# Patient Record
Sex: FEMALE | Race: ASIAN | Employment: OTHER | URBAN - METROPOLITAN AREA
[De-identification: names, ages, dates, MRNs, and addresses within clinical notes are randomized per-mention and may not be internally consistent; named-entity substitution may affect disease eponyms.]

---

## 2022-06-01 NOTE — PROGRESS NOTES
Juan Antonio Lizama   68 y.o. female   1945    This is patient's first visit with me. Juan Antonio Lizama is here to establish care as their new PCP. Juan Antonio Lizama has a PMH significant for:    Patient Active Problem List   Diagnosis    Non-English speaking patient - speaks Mandarin    Essential hypertension       Reason(s) for visit:   Chief Complaint   Patient presents with   Rut Eng Established New Doctor    Rash     Started on body but has spread to face. Itching. All over. Worsening. Pt moved here 6mo ago from [place].  Cough     Only through the night. On abx. HPI:    Office visit encounter:    Patient came with her son-in-law. She moved from [place] 6 months ago. Rash:  -Onset: 2-2.5 mo ago. -History: Patient stated that rash presented around her chest/body as and LLE. Rash spread to the right side of her face earlier this week. Patient saw urgent care last week and   -Triggers: worsened after taking a shower. -Meds tried:  gave her topical agents - mupirocin, erythromycin, and calamine; oral clindamdycin - prescribed by urgent care; she hasn't taken this yet    HTN:  -Meds tried: Valsartan  -No hx of TIA/CVA  -No issues of dyspnea, orthopnea, fatigue, malaise, etc.    Persistent dry cough:  -Patient has been taking Amoxicillin for \"several months\"?   Her  is a pharmacist in [place].    -Has coughing more at night  -No issues with nasal congestion, rhinorrhea, fever, sore throat, sinus pressure.  -Occurred around winter time 2022    PDMP monitoring:  -Revealed no controlled medications written of any kind.    -Last report:   Last PDMP Olga Vizcaino as Reviewed [facility]):  Review User Review Instant Review Result   [address],[place] [address], [address] 6/1/2022  2:57 PM Reviewed PDMP [1]       No Known Allergies    Current Outpatient Medications on File Prior to Visit   Medication Sig Dispense Refill    VALSARTAN PO Take by mouth      mupirocin (BACTROBAN) 2 % ointment Apply topically 3 times daily Apply topically 3 times daily.      erythromycin with ethanol (EMGEL) 2 % gel Apply topically daily Apply topically daily.  calamine lotion Apply topically as needed Apply topically as needed. No current facility-administered medications on file prior to visit. History reviewed. No pertinent family history. Social History     Tobacco Use    Smoking status: Never Smoker    Smokeless tobacco: Never Used   Substance Use Topics    Alcohol use: Never        No results found for: WBC, HGB, HCT, MCV, PLT      Chemistry    No results found for: NA, K, CL, CO2, BUN, CREATININE, GLU No results found for: CALCIUM, ALKPHOS, AST, ALT, BILITOT       No results found for: ALT, AST, GGT, ALKPHOS, BILITOT    Review of Systems   Constitutional: Negative for activity change, appetite change, fatigue, fever and unexpected weight change. HENT: Negative for congestion, rhinorrhea, sinus pressure and trouble swallowing. Respiratory: Negative for cough, chest tightness, shortness of breath and wheezing. Cardiovascular: Negative for chest pain, palpitations and leg swelling. Gastrointestinal: Negative for abdominal distention, abdominal pain, blood in stool, constipation, diarrhea, nausea and vomiting. Genitourinary: Negative for dysuria, frequency and hematuria. Musculoskeletal: Negative for arthralgias and back pain. Skin: Negative for rash. Neurological: Negative for dizziness, weakness, light-headedness, numbness and headaches. Wt Readings from Last 3 Encounters:   06/03/22 164 lb 9.6 oz (74.7 kg)       BP Readings from Last 3 Encounters:   06/03/22 118/62       Pulse Readings from Last 3 Encounters:   06/03/22 76       /62   Pulse 76   Temp 97.2 °F (36.2 °C)   Ht 5' 2\" (1.575 m)   Wt 164 lb 9.6 oz (74.7 kg)   SpO2 96%   BMI 30.11 kg/m²      Physical Exam  Vitals reviewed. Constitutional:       General: She is awake. She is not in acute distress. Appearance: She is obese.  She is not ill-appearing or diaphoretic. HENT:      Head: Normocephalic and atraumatic. No abrasion or masses. Hair is normal.      Right Ear: External ear normal.      Left Ear: External ear normal.      Nose: Nose normal.   Eyes:      General: Lids are normal. Gaze aligned appropriately. No scleral icterus. Right eye: No discharge. Left eye: No discharge. Extraocular Movements: Extraocular movements intact. Conjunctiva/sclera: Conjunctivae normal.   Neck:      Trachea: Phonation normal.   Cardiovascular:      Rate and Rhythm: Normal rate and regular rhythm. Pulmonary:      Effort: Pulmonary effort is normal. No respiratory distress. Breath sounds: No wheezing, rhonchi or rales. Abdominal:      General: Abdomen is flat. There is no distension. Palpations: Abdomen is soft. Musculoskeletal:         General: No deformity. Normal range of motion. Cervical back: Normal range of motion. No erythema. Right lower leg: No edema. Left lower leg: No edema. Skin:     Coloration: Skin is not cyanotic, jaundiced or pale. Findings: Rash (refer to images below) present. No abrasion, abscess, bruising, ecchymosis, erythema, signs of injury, laceration, lesion, petechiae or wound. Comments: LLE: impetigo type lesions with no active purulent discharge or bleeding; non-tender to palpation; fairly cool to touch; non-pitting edema. Multiple circumscribe pinpoint red lesions scattered throughout her abdomen and upper extremities with signs of excoriation. Neurological:      General: No focal deficit present. Mental Status: She is alert. Mental status is at baseline. GCS: GCS eye subscore is 4. GCS verbal subscore is 5. GCS motor subscore is 6. Cranial Nerves: No cranial nerve deficit, dysarthria or facial asymmetry. Motor: No weakness, tremor, atrophy or seizure activity. Coordination: Coordination normal.      Gait: Gait is intact.    Psychiatric: Attention and Perception: Attention and perception normal.         Mood and Affect: Mood and affect normal.         Speech: Speech normal.         Behavior: Behavior normal. Behavior is cooperative. Thought Content: Thought content normal.                         Imaging was obtained with patient's consent. Patient was informed that the pictures were taken with my own personal mobile phone and uploaded via mobile brendon (called [employer]iku) directly onto the patient's electronic health chart. Patient was also informed that any images obtained would not be saved on my mobile phone. The patient was shown the image(s) in their chart. Assessment/Plan:   Aaron Aguilar was seen today for established new doctor, rash and cough. Diagnoses and all orders for this visit:    Encounter to establish care with new doctor    Essential hypertension    Generalized rash  Comments:  Patient has impetigo type lesions and if I understood correctly has been prescribed topical abx by her  (who is a pharmacist in [place]). Will see how she responds to oral abx therapy along with topical and oral corticosteroid therapy. I also recommended that patient keep the same hygiene products and overall not try anything new at all (OTC supplements, food, drink, fragrances, etc.). I also advised patient to avoid going outdoors and drinking lots of water. Orders:  -     C-Reactive Protein; Future  -     doxycycline hyclate (VIBRA-TABS) 100 MG tablet; Take 1 tablet by mouth every 12 hours  -     cephALEXin (KEFLEX) 500 MG capsule; Take 1 capsule by mouth 3 times daily  -     methylPREDNISolone (MEDROL DOSEPACK) 4 MG tablet; Take by mouth as instructed  -     triamcinolone (KENALOG) 0.025 % ointment; Apply topically 2 times daily to neck, body, arms, and legs until resolution  -     fluocinolone 0.01 % cream; Apply twice daily to right side of face x 5-7 days until resolution  -     Hepatic Function Panel;  Future  -     Sedimentation Rate; Future    Generalized pruritus        Comments:        Cautioned patient that hydroxyzine can cause drowsiness. Orders:  -     hydrOXYzine HCl (ATARAX) 10 MG tablet; Take 1 tablet by mouth every 8 hours as needed for Itching  -     Hepatic Function Panel; Future  -     Renal Function Panel; Future  -     Sedimentation Rate; Future    Chronic cough        Comments: Will get CXR and evaluate for infection. Orders:  -     XR CHEST STANDARD (2 VW); Future  -     CBC with Auto Differential; Future  -     C-Reactive Protein; Future  -     Allergen, Respiratory, Region 5 Panel; Future  -     Procalcitonin; Future    Class 1 obesity due to excess calories with serious comorbidity and body mass index (BMI) of 30.0 to 30.9 in adult    Non-English speaking patient - speaks Mandarin  Comments:  Patient was offered an independent , but she declined that in favor of using her son-in-law. I reviewed the plan of care with the patient. Patient acknowledged understanding and agreed with plan of care overall. Medications Discontinued During This Encounter   Medication Reason    CLINDAMYCIN HCL PO Stop Taking at Discharge    AMOXICILLIN PO Stop Taking at Discharge        General information on medications:  -When it comes to medications, whether with starting or adding a new medication or increasing the dose of a current medication, the benefits and risks have to always be considered and weighed over, especially if one is taking other medications as well.    -There are no medications that have no side effects and that there is always a risk involved with taking a medication.    -If a side effect were to occur with starting a new medication or with increasing the dose of a current medication that either the medication can be totally discontinued altogether or simply decrease the dose of it and if this would be the case a follow-up appointment would be deemed necessary.    -The drug allergy list will then be updated with the corresponding side effect(s) if it's deemed to be a true 'drug allergy'. -The most common adverse effects of medication(s) were addressed at today's visit.    -Lastly, the coverage status of a medication may vary from insurance to insurance and the only way to verify if the medication is covered is to send an actual prescription in.    -The drug formulary of each insurance changes without any warning or notification to the healthcare provider let alone the pharmacy.  -The cost of medications vary from insurance to insurance and the cost is always subject to change just like the drug formulary. Follow-up: Return in about 27 days (around 6/30/2022) for IP - rash. .     Patient was informed that if his or her symptoms worsen to follow up with me sooner or go to the nearest ER if the symptoms are very significant and warrant higher level of care. Regarding my note:  -This note was composed (by me only and not with assistance via a scribe) to the best of my knowledge and recollection of the encounter with the patient using one of my own customized note templates utilizing a combination of typing and dictating with the [address] [place] [address] speech recognition software. As a result, the note may possibly contain various errors (e.g. spelling, grammar, and non-sensible words/phrases/statements) despite reviewing the note prior to signing it for completion. Time spent includes some or all of the following, both face-to-face time and non face-to-face time, but is not limited to:  [x] Preparing to see the patient by reviewing medical records available (notes, labs, imaging, etc.) prior to seeing the patient. [x] Obtaining and/or reviewing the history from the patient. [x] Performing a medically appropriate examination. [x] Ordering of relevant lab work, medications, referrals, or procedures.   [x] Discussing patient's medical issues and formulating an assessment and plan. [x] Reviewing plan of care with patient. Answering any questions or concerns. [x] Documentation within the electronic health record (EHR)  [] Reviewing records of history relevant to patient's issues after seeing the patient. [] Discussion or coordination of care with other health care professionals  [] Other:     [place]. Jordy Hill M.D.   [address] [place] [address]    Electronically signed by Khushbu Bonilla MD on 6/3/2022 at 5:09 PM.

## 2022-06-03 ENCOUNTER — OFFICE VISIT (OUTPATIENT)
Dept: FAMILY MEDICINE CLINIC | Age: 77
End: 2022-06-03

## 2022-06-03 VITALS
WEIGHT: 164.6 LBS | SYSTOLIC BLOOD PRESSURE: 118 MMHG | HEIGHT: 62 IN | BODY MASS INDEX: 30.29 KG/M2 | HEART RATE: 76 BPM | OXYGEN SATURATION: 96 % | DIASTOLIC BLOOD PRESSURE: 62 MMHG | TEMPERATURE: 97.2 F

## 2022-06-03 DIAGNOSIS — Z76.89 ENCOUNTER TO ESTABLISH CARE WITH NEW DOCTOR: Primary | ICD-10-CM

## 2022-06-03 DIAGNOSIS — I10 ESSENTIAL HYPERTENSION: ICD-10-CM

## 2022-06-03 DIAGNOSIS — R21 GENERALIZED RASH: ICD-10-CM

## 2022-06-03 DIAGNOSIS — Z78.9 NON-ENGLISH SPEAKING PATIENT: ICD-10-CM

## 2022-06-03 DIAGNOSIS — E66.09 CLASS 1 OBESITY DUE TO EXCESS CALORIES WITH SERIOUS COMORBIDITY AND BODY MASS INDEX (BMI) OF 30.0 TO 30.9 IN ADULT: ICD-10-CM

## 2022-06-03 DIAGNOSIS — L29.9 GENERALIZED PRURITUS: ICD-10-CM

## 2022-06-03 DIAGNOSIS — R05.3 CHRONIC COUGH: ICD-10-CM

## 2022-06-03 PROCEDURE — 99204 OFFICE O/P NEW MOD 45 MIN: CPT | Performed by: FAMILY MEDICINE

## 2022-06-03 PROCEDURE — 1123F ACP DISCUSS/DSCN MKR DOCD: CPT | Performed by: FAMILY MEDICINE

## 2022-06-03 RX ORDER — TRIAMCINOLONE ACETONIDE 0.25 MG/G
OINTMENT TOPICAL
Qty: 80 G | Refills: 1 | Status: SHIPPED | OUTPATIENT
Start: 2022-06-03 | End: 2022-06-10

## 2022-06-03 RX ORDER — HYDROXYZINE HYDROCHLORIDE 10 MG/1
10 TABLET, FILM COATED ORAL EVERY 8 HOURS PRN
Qty: 30 TABLET | Refills: 0 | Status: SHIPPED | OUTPATIENT
Start: 2022-06-03

## 2022-06-03 RX ORDER — DOXYCYCLINE HYCLATE 100 MG
100 TABLET ORAL EVERY 12 HOURS
Qty: 60 TABLET | Refills: 0 | Status: SHIPPED | OUTPATIENT
Start: 2022-06-03 | End: 2022-07-03

## 2022-06-03 RX ORDER — CEPHALEXIN 500 MG/1
500 CAPSULE ORAL 3 TIMES DAILY
Qty: 90 CAPSULE | Refills: 0 | Status: SHIPPED | OUTPATIENT
Start: 2022-06-03 | End: 2022-07-03

## 2022-06-03 RX ORDER — METHYLPREDNISOLONE 4 MG/1
TABLET ORAL
Qty: 1 KIT | Refills: 0 | Status: SHIPPED
Start: 2022-06-03 | End: 2022-06-30 | Stop reason: HOSPADM

## 2022-06-03 RX ORDER — CALAMINE
LOTION (ML) TOPICAL PRN
COMMUNITY

## 2022-06-03 RX ORDER — ERYTHROMYCIN 20 MG/G
GEL TOPICAL DAILY
COMMUNITY

## 2022-06-03 ASSESSMENT — ENCOUNTER SYMPTOMS
ABDOMINAL PAIN: 0
SHORTNESS OF BREATH: 0
TROUBLE SWALLOWING: 0
BLOOD IN STOOL: 0
DIARRHEA: 0
RHINORRHEA: 0
CONSTIPATION: 0
VOMITING: 0
BACK PAIN: 0
NAUSEA: 0
ABDOMINAL DISTENTION: 0
SINUS PRESSURE: 0
WHEEZING: 0
COUGH: 0
CHEST TIGHTNESS: 0

## 2022-06-03 ASSESSMENT — PATIENT HEALTH QUESTIONNAIRE - PHQ9
SUM OF ALL RESPONSES TO PHQ9 QUESTIONS 1 & 2: 0
SUM OF ALL RESPONSES TO PHQ QUESTIONS 1-9: 0
2. FEELING DOWN, DEPRESSED OR HOPELESS: 0
SUM OF ALL RESPONSES TO PHQ QUESTIONS 1-9: 0
1. LITTLE INTEREST OR PLEASURE IN DOING THINGS: 0

## 2022-06-27 NOTE — PROGRESS NOTES
Crispin Calderón   68 y.o. female   1945    HPI:    Patient was last seen by me on 6/3/2022. During our last office visit:   -Seen for generalized rash. She was prescribed 2 abx (doxycycline and cephalexin x 3 weeks), medrol dose marisel, and topical corticosteroid ointments. Reason(s) for visit:   Chief Complaint   Patient presents with    Follow-up     Pt reported a new rash but stated that it's not as severe.        Persistent dry cough:  -Patient has been taking Amoxicillin for \"several months\"? Her  is a pharmacist in [place].    -Has coughing more at night  -No issues with nasal congestion, rhinorrhea, fever, sore throat, sinus pressure.  -Occurred around winter time 2022    Updates:  -Ordered comprehensive lab work including CXR. This has not been done. Patient has not had an issue with cough recently. Generalized rash:  -Rash has significantly improved, but has some new lesions (milder) on her arm and left-side of her back. Reported that she still has issues with itching.  -Denied using any new hygiene products or other things. NEW ISSUES:  -Patient's son brought a list of meds she has tried when she was living [place] and asked to refill these. Sleep meds:  -Patient has tried melatonin, triazolam, ertazolam.    Depression:  -Meds tried: Paroxetine    Vertigo:  -Meds tried: Betahistine - about 3-5x/mo   -No hx of head trauma/injury. Last PDMP Marita Rivas as Reviewed [facility]):  Review User Review Instant Review Result   [address], YISEL 6/30/2022  2:48 PM Reviewed PDMP [1]       No Known Allergies    Current Outpatient Medications on File Prior to Visit   Medication Sig Dispense Refill    VALSARTAN PO Take by mouth      mupirocin (BACTROBAN) 2 % ointment Apply topically 3 times daily Apply topically 3 times daily.  erythromycin with ethanol (EMGEL) 2 % gel Apply topically daily Apply topically daily.  calamine lotion Apply topically as needed Apply topically as needed.       doxycycline hyclate (VIBRA-TABS) 100 MG tablet Take 1 tablet by mouth every 12 hours 60 tablet 0    cephALEXin (KEFLEX) 500 MG capsule Take 1 capsule by mouth 3 times daily 90 capsule 0    methylPREDNISolone (MEDROL DOSEPACK) 4 MG tablet Take by mouth as instructed 1 kit 0    fluocinolone 0.01 % cream Apply twice daily to right side of face x 5-7 days until resolution 60 g 0    hydrOXYzine HCl (ATARAX) 10 MG tablet Take 1 tablet by mouth every 8 hours as needed for Itching 30 tablet 0     No current facility-administered medications on file prior to visit. No family history on file. Social History     Tobacco Use    Smoking status: Never Smoker    Smokeless tobacco: Never Used   Substance Use Topics    Alcohol use: Never        No results found for: WBC, HGB, HCT, MCV, PLT      Chemistry    No results found for: NA, K, CL, CO2, BUN, CREATININE, GLU No results found for: CALCIUM, ALKPHOS, AST, ALT, BILITOT       No results found for: ALT, AST, GGT, ALKPHOS, BILITOT    Review of Systems   Constitutional: Negative for activity change, appetite change, fatigue, fever and unexpected weight change. HENT: Negative for congestion, rhinorrhea, sinus pressure and trouble swallowing. Respiratory: Negative for cough, chest tightness, shortness of breath and wheezing. Cardiovascular: Negative for chest pain, palpitations and leg swelling. Gastrointestinal: Negative for abdominal distention, abdominal pain, blood in stool, constipation, diarrhea, nausea and vomiting. Genitourinary: Negative for dysuria, frequency and hematuria. Musculoskeletal: Negative for arthralgias and back pain. Skin: Positive for rash. Neurological: Negative for dizziness, weakness, light-headedness, numbness and headaches. Psychiatric/Behavioral: Positive for sleep disturbance. The patient is not nervous/anxious.         Wt Readings from Last 3 Encounters:   06/30/22 164 lb 6.4 oz (74.6 kg)   06/03/22 164 lb 9.6 oz (74.7 kg)       BP Readings from Last 3 Encounters:   06/30/22 122/84   06/03/22 118/62       Pulse Readings from Last 3 Encounters:   06/30/22 90   06/03/22 76       /84   Pulse 90   Temp 97.2 °F (36.2 °C)   Wt 164 lb 6.4 oz (74.6 kg)   SpO2 97%   BMI 30.07 kg/m²      Physical Exam  Vitals reviewed. Constitutional:       General: She is awake. She is not in acute distress. Appearance: She is obese. She is not ill-appearing or diaphoretic. HENT:      Head: Normocephalic and atraumatic. No abrasion or masses. Hair is normal.      Right Ear: External ear normal.      Left Ear: External ear normal.      Nose: Nose normal.   Eyes:      General: Lids are normal. Gaze aligned appropriately. No scleral icterus. Right eye: No discharge. Left eye: No discharge. Extraocular Movements: Extraocular movements intact. Conjunctiva/sclera: Conjunctivae normal.   Neck:      Trachea: Phonation normal.   Cardiovascular:      Rate and Rhythm: Normal rate and regular rhythm. Pulmonary:      Effort: Pulmonary effort is normal. No respiratory distress. Breath sounds: No wheezing, rhonchi or rales. Abdominal:      General: Abdomen is flat. There is no distension. Palpations: Abdomen is soft. Musculoskeletal:         General: No deformity. Normal range of motion. Cervical back: Normal range of motion. No erythema. Right lower leg: No edema. Left lower leg: No edema. Skin:     Coloration: Skin is not cyanotic, jaundiced or pale. Findings: Rash present. No abrasion, abscess, bruising, ecchymosis, erythema, signs of injury, laceration, lesion, petechiae or wound. Rash is not crusting, macular, nodular, papular, purpuric, pustular, scaling, urticarial or vesicular. Comments: Small pin point lesions of her back (some red) scattered throughout. Neurological:      General: No focal deficit present. Mental Status: She is alert.  Mental status is at baseline. GCS: GCS eye subscore is 4. GCS verbal subscore is 5. GCS motor subscore is 6. Cranial Nerves: No cranial nerve deficit, dysarthria or facial asymmetry. Motor: No weakness, tremor, atrophy or seizure activity. Coordination: Coordination normal.      Gait: Gait is intact. Psychiatric:         Attention and Perception: Attention and perception normal.         Mood and Affect: Mood and affect normal.         Speech: Speech normal.         Behavior: Behavior normal. Behavior is cooperative. Thought Content: Thought content normal.                   Imaging was obtained with patient's consent. Patient was informed that the pictures were taken with my own personal mobile phone and uploaded via mobile brendon (called [employer]iku) directly onto the patient's electronic health chart. Patient was also informed that any images obtained would not be saved on my mobile phone. The patient was shown the image(s) in their chart. Assessment/Plan:   Kevin Moore was seen today for follow-up. Diagnoses and all orders for this visit:    Generalized rash  Comments: The cause of the rash is unknown. No additional abx therapy warranted. Recommended using Goldbond cream.  We discussed to not try any new hygiene products, topical agents, and other things. If rash persists despite keeping everything the same then I recommended to the patient it's worth doing allergy testing. Patient understood and agreed. Depression, major, recurrent, in complete remission ([facility] [address]  Comments:  Recommended not continuing Paroxetine given anti-cholinergic effects (given her age). I discussed with patient the difference between acute vs preventative/maintenance medications regarding treatment of anxiety/depression/bipolar disorder.  Drugs of the SSRI/SNRI class as well as anti-psychotics can have side effects such as weight gain, sexual dysfunction, insomnia, headache, abdominal discomfort, nausea, vomiting, etc. These medications are generally effective at alleviating symptoms of anxiety and/or depression, but side effects tend to occur within the first 1-2 weeks of taking the medication. Patient was informed to let me know if any significant side effects do occur. Patient was instructed to take the medication(s) every day as directed and that this medication is not an as needed medication because the medication may take at least 2 weeks to see a difference if not at least 4-6 weeks to have a beneficial effect and that by going even 1-2 days without taking the medication one could risk of having rebound anxiety/depression. In addition, the patient was informed that this medication cannot be abruptly stopped after having had taken it for a long period of time and that the medication would have to be gradually tapered off under the guidance of a healthcare provider. The patient was informed that 4-6 weeks follow-up is generally recommended follow-up time for starting/adding a new medication or with adjusting the dose of any given medication. I have discussed with patient (at some point) and made aware that some patients may benefit from more than one medication compared to monotherapy. A combination of both medical and behavioral therapy may yield better/more effective results than either therapy alone. Lastly, it's worth mentioning that the medication(s) prescribed will not completely resolve feelings of anxiety/depression as well as make one immune or completely prevent from having more issues with anxiety and/or depression. Stress (emotional & physical) is part of everyday life. Orders:  -     escitalopram (LEXAPRO) 10 MG tablet; Take 1 tablet by mouth daily    Dizziness  -     meclizine (ANTIVERT) 12.5 MG tablet; Take 1 tablet by mouth 3 times daily as needed for Dizziness    Chronic insomnia  Comments:  Informed pt that Temazepam is the only FDA approved benzo for treatment of insomnia.   Will continue benzodiazepine therapy for with plans to gradually taper her off it. Consider switching a trial of Trazodone, Mirtazapine, Doxepin, etc.    Patient was advised to maintain good sleep hygiene: 1) go to bed and wake up around same time 2) aim for 7-8 hours of sleep 3) maintain overall quiet, dark, and cool environment 4) have a mattress that's comfortable (whether firm/soft/medium) 5) avoid eating and/or exercising late at night 6) avoid drinking or consuming caffeine, energy drinks, or food high in sugar at least 4-6 hours before desired bedtime 7) if taking sleep med(s), to find the best timing to ensure falling asleep on time as well as to help with minimizing drowsiness the following day and stick to that. I also recommended that it may be helpful to put a reminder on their phone (or elsewhere) to remind them it's time to take their medication(s). Orders:  -     temazepam (RESTORIL) 7.5 MG capsule; Take 1 capsule by mouth nightly as needed for Sleep for up to 30 days. Chronic prescription benzodiazepine use  Comments:  I discussed the long term side effects of benzodiazepines with the patient which include (but not all): cognitive impairment (drowsiness, increased reaction time, ataxia, motor incoordination, anterograde amnesia), mood swings, sleep problems, decreased respiratory drive especially with concomitant use of opioids and/or alcohol use, etc.  It's also of concern that patients can develop issues with dependence, abuse, intolerance as well as issues with withdrawal, especially with suddenly discontinuing it. Patient was informed that long-term use can lead to physiological dependence after just a few weeks and especially after months of use.   Patient was also informed that benzodiazepines are often not used nowadays as first-line treatment for anxiety, depression or bipolar issues because of the aforementioned reasons and because there are just other better overall more effective medications widely available (e.g. SSRI or SNRI or anti-psychotics). Non-English speaking patient - speaks Mandarin  Comments:  Son served as . I reviewed the plan of care with the patient. Patient acknowledged understanding and agreed with plan of care overall. Medications Discontinued During This Encounter   Medication Reason    mupirocin (BACTROBAN) 2 % ointment Stop Taking at Discharge    methylPREDNISolone (MEDROL DOSEPACK) 4 MG tablet Stop Taking at Discharge    PARoxetine (PAXIL) 20 MG tablet Alternate therapy        General information on medications:  -When it comes to medications, whether with starting or adding a new medication or increasing the dose of a current medication, the benefits and risks have to always be considered and weighed over, especially if one is taking other medications as well. -There are no medications that have no side effects and that there is always a risk involved with taking a medication.    -If a side effect were to occur with starting a new medication or with increasing the dose of a current medication that either the medication can be totally discontinued altogether or simply decrease the dose of it and if this would be the case a follow-up appointment would be deemed necessary.    -The drug allergy list will then be updated with the corresponding side effect(s) if it's deemed to be a true 'drug allergy'. -The most common adverse effects of medication(s) were addressed at today's visit.    -Lastly, the coverage status of a medication may vary from insurance to insurance and the only way to verify if the medication is covered is to send an actual prescription in.    -The drug formulary of each insurance changes without any warning or notification to the healthcare provider let alone the pharmacy.  -The cost of medications vary from insurance to insurance and the cost is always subject to change just like the drug formulary.     Follow-up: Return in about 4 weeks (around 7/28/2022) for IP - rash, depression, insomnia. .     Patient was informed that if his or her symptoms worsen to follow up with me sooner or go to the nearest ER if the symptoms are very significant and warrant higher level of care. Regarding my note:  -This note was composed (by me only and not with assistance via a scribe) to the best of my knowledge and recollection of the encounter with the patient using one of my own customized note templates utilizing a combination of typing and dictating with the [address] [place] [address] speech recognition software. As a result, the note may possibly contain various errors (e.g. spelling, grammar, and non-sensible words/phrases/statements) despite reviewing the note prior to signing it for completion. Time spent includes some or all of the following, both face-to-face time and non face-to-face time, but is not limited to:  [x] Preparing to see the patient by reviewing medical records available (notes, labs, imaging, etc.) prior to seeing the patient. [x] Obtaining and/or reviewing the history from the patient. [x] Performing a medically appropriate examination. [x] Ordering of relevant lab work, medications, referrals, or procedures. [x] Discussing patient's medical issues and formulating an assessment and plan. [x] Reviewing plan of care with patient. Answering any questions or concerns. [x] Documentation within the electronic health record (EHR)  [] Reviewing records of history relevant to patient's issues after seeing the patient. [] Discussion or coordination of care with other health care professionals  [x] Other: length office visit - 35 minutes.  -I spent a significant amount of time discussing various issues as noted above and also with formulating a treatment plan for each specific issue. Patient was given the opportunity to ask me any questions and address any concerns/issues.  I also reviewed lab work (if available) as well as prior notes from PCP and/or other specialists if available. Rufino Scales M.D.   [address] [place] [address]    Electronically signed by Kellee Romano MD M.D. on 7/1/2022 at 12:30 AM.

## 2022-06-30 ENCOUNTER — OFFICE VISIT (OUTPATIENT)
Dept: FAMILY MEDICINE CLINIC | Age: 77
End: 2022-06-30

## 2022-06-30 VITALS
BODY MASS INDEX: 30.07 KG/M2 | HEART RATE: 90 BPM | WEIGHT: 164.4 LBS | OXYGEN SATURATION: 97 % | DIASTOLIC BLOOD PRESSURE: 84 MMHG | SYSTOLIC BLOOD PRESSURE: 122 MMHG | TEMPERATURE: 97.2 F

## 2022-06-30 DIAGNOSIS — F51.04 CHRONIC INSOMNIA: ICD-10-CM

## 2022-06-30 DIAGNOSIS — R21 GENERALIZED RASH: Primary | ICD-10-CM

## 2022-06-30 DIAGNOSIS — R42 DIZZINESS: ICD-10-CM

## 2022-06-30 DIAGNOSIS — Z78.9 NON-ENGLISH SPEAKING PATIENT: ICD-10-CM

## 2022-06-30 DIAGNOSIS — F33.42 DEPRESSION, MAJOR, RECURRENT, IN COMPLETE REMISSION (HCC): ICD-10-CM

## 2022-06-30 DIAGNOSIS — Z79.899 CHRONIC PRESCRIPTION BENZODIAZEPINE USE: ICD-10-CM

## 2022-06-30 PROCEDURE — 99214 OFFICE O/P EST MOD 30 MIN: CPT | Performed by: FAMILY MEDICINE

## 2022-06-30 PROCEDURE — 1123F ACP DISCUSS/DSCN MKR DOCD: CPT | Performed by: FAMILY MEDICINE

## 2022-06-30 RX ORDER — MECLIZINE HCL 12.5 MG/1
12.5 TABLET ORAL 3 TIMES DAILY PRN
Qty: 15 TABLET | Refills: 5 | Status: SHIPPED | OUTPATIENT
Start: 2022-06-30

## 2022-06-30 RX ORDER — ESCITALOPRAM OXALATE 10 MG/1
10 TABLET ORAL DAILY
Qty: 30 TABLET | Refills: 2 | Status: SHIPPED | OUTPATIENT
Start: 2022-06-30

## 2022-06-30 RX ORDER — TEMAZEPAM 7.5 MG/1
7.5 CAPSULE ORAL NIGHTLY PRN
Qty: 30 CAPSULE | Refills: 0 | Status: SHIPPED | OUTPATIENT
Start: 2022-06-30 | End: 2022-07-30

## 2022-06-30 RX ORDER — PAROXETINE HYDROCHLORIDE 20 MG/1
20 TABLET, FILM COATED ORAL EVERY MORNING
COMMUNITY
End: 2022-06-30 | Stop reason: ALTCHOICE

## 2022-07-01 ASSESSMENT — ENCOUNTER SYMPTOMS
SHORTNESS OF BREATH: 0
RHINORRHEA: 0
TROUBLE SWALLOWING: 0
CONSTIPATION: 0
DIARRHEA: 0
VOMITING: 0
WHEEZING: 0
ABDOMINAL PAIN: 0
BACK PAIN: 0
ABDOMINAL DISTENTION: 0
COUGH: 0
SINUS PRESSURE: 0
NAUSEA: 0
BLOOD IN STOOL: 0
CHEST TIGHTNESS: 0